# Patient Record
Sex: FEMALE | Race: WHITE | NOT HISPANIC OR LATINO | URBAN - METROPOLITAN AREA
[De-identification: names, ages, dates, MRNs, and addresses within clinical notes are randomized per-mention and may not be internally consistent; named-entity substitution may affect disease eponyms.]

---

## 2018-09-19 ENCOUNTER — HOSPITAL ENCOUNTER (OUTPATIENT)
Dept: LAB | Facility: MEDICAL CENTER | Age: 33
End: 2018-09-19
Attending: PSYCHIATRY & NEUROLOGY
Payer: MEDICARE

## 2018-09-19 ENCOUNTER — OFFICE VISIT (OUTPATIENT)
Dept: NEUROLOGY | Facility: MEDICAL CENTER | Age: 33
End: 2018-09-19
Payer: MEDICARE

## 2018-09-19 VITALS
BODY MASS INDEX: 18.4 KG/M2 | TEMPERATURE: 98.1 F | SYSTOLIC BLOOD PRESSURE: 110 MMHG | HEIGHT: 62 IN | HEART RATE: 70 BPM | RESPIRATION RATE: 20 BRPM | WEIGHT: 100 LBS | OXYGEN SATURATION: 91 % | DIASTOLIC BLOOD PRESSURE: 62 MMHG

## 2018-09-19 DIAGNOSIS — G40.409 SIMPLE PARTIAL SEIZURES EVOLVING TO GENERALIZED TONIC-CLONIC SEIZURES (HCC): Primary | ICD-10-CM

## 2018-09-19 DIAGNOSIS — G80.8 CEREBRAL PALSY, QUADRIPLEGIC (HCC): ICD-10-CM

## 2018-09-19 PROBLEM — R56.9 SEIZURE (HCC): Status: ACTIVE | Noted: 2018-09-19

## 2018-09-19 LAB
25(OH)D3 SERPL-MCNC: 35 NG/ML (ref 30–100)
AMMONIA PLAS-SCNC: 35 UMOL/L (ref 11–45)
VALPROATE SERPL-MCNC: 95.5 UG/ML (ref 50–100)

## 2018-09-19 PROCEDURE — 82306 VITAMIN D 25 HYDROXY: CPT | Mod: GA

## 2018-09-19 PROCEDURE — 80164 ASSAY DIPROPYLACETIC ACD TOT: CPT

## 2018-09-19 PROCEDURE — 36415 COLL VENOUS BLD VENIPUNCTURE: CPT

## 2018-09-19 PROCEDURE — 99205 OFFICE O/P NEW HI 60 MIN: CPT | Performed by: PSYCHIATRY & NEUROLOGY

## 2018-09-19 PROCEDURE — 82140 ASSAY OF AMMONIA: CPT

## 2018-09-19 RX ORDER — PNV NO.95/FERROUS FUM/FOLIC AC 28MG-0.8MG
TABLET ORAL
COMMUNITY
End: 2022-03-21

## 2018-09-19 RX ORDER — LEVETIRACETAM 500 MG/1
TABLET ORAL
Refills: 1 | COMMUNITY
Start: 2018-09-07 | End: 2019-03-20

## 2018-09-19 RX ORDER — DIVALPROEX SODIUM 125 MG/1
CAPSULE, COATED PELLETS ORAL
Qty: 240 CAP | Refills: 11 | Status: SHIPPED | OUTPATIENT
Start: 2018-09-19 | End: 2020-08-07 | Stop reason: SDUPTHER

## 2018-09-19 RX ORDER — LEVETIRACETAM 750 MG/1
1 TABLET, FILM COATED, EXTENDED RELEASE ORAL DAILY
Qty: 30 TAB | Refills: 1 | Status: SHIPPED | OUTPATIENT
Start: 2018-09-19 | End: 2018-11-29 | Stop reason: SDUPTHER

## 2018-09-19 RX ORDER — DIVALPROEX SODIUM 125 MG/1
CAPSULE, COATED PELLETS ORAL
Refills: 5 | COMMUNITY
Start: 2018-08-22 | End: 2018-09-19 | Stop reason: SDUPTHER

## 2018-09-19 RX ORDER — BACLOFEN 10 MG/1
TABLET ORAL
Refills: 5 | COMMUNITY
Start: 2018-08-21 | End: 2018-09-19 | Stop reason: SDUPTHER

## 2018-09-19 RX ORDER — POLYETHYLENE GLYCOL 3350 17 G/17G
POWDER, FOR SOLUTION ORAL
Refills: 11 | COMMUNITY
Start: 2018-07-19

## 2018-09-19 RX ORDER — SERTRALINE HYDROCHLORIDE 100 MG/1
200 TABLET, FILM COATED ORAL DAILY
Refills: 5 | COMMUNITY
Start: 2018-08-21

## 2018-09-19 RX ORDER — BACLOFEN 10 MG/1
10 TABLET ORAL 3 TIMES DAILY
Qty: 270 TAB | Refills: 3 | Status: SHIPPED | OUTPATIENT
Start: 2018-09-19 | End: 2022-03-21 | Stop reason: CLARIF

## 2018-09-19 NOTE — PROGRESS NOTES
Subjective:      Marie Sanchez is a 32 y.o. female who presents with her mother Shahnaz, who provides all of the history, the patient herself noncommunicative, with a history of  CP and symptomatic seizures.    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    The events themselves are fairly stereotyped, her mother describes the lower extremities engaging in myoclonic activity, initially more of the left leg than right, now bilateral. She seems to zone out and is clearly less responsive, a head begins to turn to the left, she will also have gaze vergence to the left, now completely unresponsive, there is bilateral tonic posturing. The seizures can last upwards of a couple of minutes, the longest was 5 minutes. Recovery is full though can take upwards of 45 minutes depending on duration of the seizure itself.    The seizures tend to occur in the afternoons or early evenings, they have never happened while the patient was asleep. They tend to cluster together, usually no more than 2 at a time, though she can have them in isolation. Over time the frequency, severity and duration as well as the number of headaches in a day have all been increasing. The longer she has been without a seizure now bases about a week or 2, in the past the interval between events was longer.    Under the care of several local neurologists, this stopped about 10 years ago. Originally placed on Dilantin and Tegretol, these provided limited benefit only. Placed on Depakote, she now uses 125 mg sprinkle tablet, she is been averaging anywhere from #8-#9 tablets in a day, her mother believes the dose was varied depending on drug levels and efficacy. She has never been on a higher number of tablets in a day. There does not appear to be issues with side effects. Her mother does not know her drug levels.    Patient does take  her medications when her mother gives in to her. About 6 weeks ago, she actually had her first day of more than 3 seizures, she was placed on Keppra 500 mg daily by her PCP, and in the last 6 weeks she is actually had only 2 seizures, these occurred in isolation and were of shorter duration. Her mother has not noted any changes in personality, drowsiness throughout the day or tremulousness since she was started on the drug.    Her CP has limited her ability to communicate though she seems to be able to acknowledge certain needs including going to the bathroom. She needs to be fed, she does recognize her mother and people she knows, can do some things for herself. She can grab with both upper extremities, though the left is more limited than the right. She can stand but only with maximal assistance. She has been on baclofen 10 mg, 3 times a day for spasticity, having undergone bilateral hamstring tendon release a while ago. She has been on Zoloft 100 mg daily for some time because of panic episodes when she was younger. Constipation is a real problem.    Medical history is otherwise unremarkable for significant issues. There is no history of liver or kidney disease, malignancy, thyroid disease, heart disease, PVD, or other neurologic disease. There is no other surgical history of note.    Interestingly, both her mother and brother suffer from seizures, her mother also from depression. Her father  from cancer. There is no other family medical history of note. She is a does not smoke or drink. She has a daily care facility that she attends, she seems to be quite happy when she is there.    She is on Depakote 125 mg tablets, 3-3-2 taken over the day, Zoloft 100 mg daily, baclofen 10 mg, 3 times a day, Meseret lax, the rest as per the electronic health record, noncontributory from my standpoint.    Review of Systems   Unable to perform ROS: Mental acuity        Objective:     /62 (BP Location: Left arm, Patient  "Position: Sitting)   Pulse 70   Temp 36.7 °C (98.1 °F) (Temporal)   Resp 20   Ht 1.575 m (5' 2\")   Wt 45.4 kg (100 lb)   SpO2 91%   BMI 18.29 kg/m²      Physical Exam    She appears in a wheelchair, and is in no acute distress. Her vital signs are stable. There is no malar rash. The neck is supple. Cardiac evaluation reveals a regular rhythm. She has bilateral AFOs.    She does recognize her mother when her name is called, she does look at the examiner when he does as well. She acknowledges the examiner and both spatial hemifield. There is no purposeful verbalization, mostly redundant grunts and sounds, though she can follow some commands with both hands such as squeezing the examiner's fingers.    PERRLA, eyes are moved with disconjugate see, to threat she does blink and avoid appropriately. She feels temperature bilaterally, she withdraws to noxious stimuli on both sides of the face. She mumbles unintelligibly otherwise.    Musculoskeletal exam reveals a spastic quadriparesis, both legs more notably involved in the arms, the left arm more involved than the right. She does elevate the upper extremities and can use them with some purposefulness. Reflexes cannot be accurately assessed.    She can grab the examiner's finger with both upper extremities though it is more just ataxic with the left.    She does localize and withdraw to noxious stimuli when applied in all 4 extremities.     Assessment/Plan:     1. Simple partial seizures evolving to generalized tonic-clonic seizures (HCC)  I would like to further improve seizure control, her PCP was appropriate in adding Keppra, though we could consider increasing Depakote moving forwards. Keppra  mg will be tried, taken in the morning to provide better benefit throughout the day when she is more likely to have seizures. Sustained-release formulation is always better tolerated. I talked about side effects with her mother. Her mother will keep track of these " events as a measure of efficacy. I will check a Depakote level, ammonia and a vitamin D level. I talked with her mother about the higher risk of osteoporosis in patients or sedentary, though first generation antiepileptics increase this risk notably. I also told Shahnaz to give her daughter a vitamin D 1000 mg supplement every day.    We spent some time talking about her seizures, treatment options, prognosis, etc. I also gave her mother instructions for the care facility to follow in case Marie does have a seizure while under their care. Her mother was told that Marie does not need to go to an emergency room if isolated seizures recur, rather only if she has multiple seizures in rapid succession over a 30 minute interval or less. Her mother will call the office in regards to drug tolerability issues and seizure recurrences. Though I would like to get an EEG study as a baseline test, it will be difficult to do so, I will talk with her mother about this moving forwards.    - Divalproex Sodium (DEPAKOTE) 125 MG Capsule Delayed Release Sprinkle; 3 tab bid and 2 tab qhs  Dispense: 240 Cap; Refill: 11  - Levetiracetam 750 MG TABLET SR 24 HR; Take 1 Tab by mouth every day.  Dispense: 30 Tab; Refill: 1  - VALPROIC ACID; Standing  - AMMONIA; Standing  - VITAMIN D,25 HYDROXY; Standing    2. Cerebral palsy, quadriplegic (HCC)  A static process, obviously, I spent some time with her mother talking about her lifelong survival rate, issues in regards to medical complications and disability as she ages, increasing risks of things like infection, skin breakdown, general debility with reduced by mouth intake, etc., as it is these acute medical conditions that ultimately result in death. Though her mother asked for an estimate as to how long I think her daughter could survive, I told her that is not something that can be predicted at this time. Baclofen will be continued.    - baclofen (LIORESAL) 10 MG Tab; Take 1 Tab by mouth 3 times  a day.  Dispense: 270 Tab; Refill: 3    Time: 60 minutes was spent face-to-face with the patient and her mother, for example, review, discussion, and education, of this over 50% of the time was spent with the patient's mother counseling and coordinating care.

## 2018-11-29 DIAGNOSIS — G40.409 SIMPLE PARTIAL SEIZURES EVOLVING TO GENERALIZED TONIC-CLONIC SEIZURES (HCC): ICD-10-CM

## 2018-11-29 RX ORDER — LEVETIRACETAM 750 MG/1
1 TABLET, FILM COATED, EXTENDED RELEASE ORAL DAILY
Qty: 90 TAB | Refills: 1 | Status: SHIPPED | OUTPATIENT
Start: 2018-11-29 | End: 2020-01-22

## 2019-03-20 ENCOUNTER — OFFICE VISIT (OUTPATIENT)
Dept: NEUROLOGY | Facility: MEDICAL CENTER | Age: 34
End: 2019-03-20
Payer: MEDICARE

## 2019-03-20 VITALS
BODY MASS INDEX: 18.29 KG/M2 | HEART RATE: 71 BPM | RESPIRATION RATE: 17 BRPM | OXYGEN SATURATION: 93 % | WEIGHT: 100 LBS | DIASTOLIC BLOOD PRESSURE: 76 MMHG | TEMPERATURE: 96.8 F | SYSTOLIC BLOOD PRESSURE: 130 MMHG

## 2019-03-20 DIAGNOSIS — G40.409 SIMPLE PARTIAL SEIZURES EVOLVING TO GENERALIZED TONIC-CLONIC SEIZURES (HCC): Primary | ICD-10-CM

## 2019-03-20 PROCEDURE — 99214 OFFICE O/P EST MOD 30 MIN: CPT | Performed by: PSYCHIATRY & NEUROLOGY

## 2019-03-20 RX ORDER — LACOSAMIDE 50 MG/1
TABLET ORAL
Qty: 120 TAB | Refills: 1 | Status: SHIPPED | OUTPATIENT
Start: 2019-03-20 | End: 2019-05-08 | Stop reason: SDUPTHER

## 2019-03-20 NOTE — PROGRESS NOTES
Subjective:      Marie Sanchez is a 33 y.o. female who presents with her mother Shahnaz, who provides the history, for follow-up, with a history of symptomatic focal seizures with secondary generalization related to CP with severe cognitive impairment.    HPI    When last seen, her seizures had started to come under better control, already on a stable dose of Depakene sprinkles, Keppra 500 mg was added, I had increased to 750 mg.  Her mother states that she has had no secondary generalized seizures, even the simple seizures with altered alertness have improved markedly.    Unfortunately, the longer she has been on Keppra, there has been a notable change in her personality.  Though the behaviors are in and of themselves chronic, it was part of her personality, the intensity and duration has worsened.  At times she will even start to cry spontaneously, seemingly inconsolable for upwards of 30-40 minutes.  She is still compliant with her medications otherwise.  She is eating well.  Between these outbursts, she does well.  She is still capable of communicating her needs to her mother as she has been, etc.    Other than the Keppra, Zoloft was increased to help with some of these emotional outbursts, now at 200 mg daily, with questionable benefit.    Medical, surgical and family histories are reviewed, there are no new drug allergies.  They are now looking to slowly transition her into a long-term care facility, beginning in the next month, they will have her stay for several days at a time, slowly increasing the duration where she is away from home.  She is on Depakene 125 mg sprinkles, 3-3-2 taken of the day, Zoloft 200 mg daily, baclofen 10 mg, 3 times daily, Keppra 750 mg daily, Zantac, iron supplement and MiraLAX.    Review of Systems   Unable to perform ROS: Mental acuity        Objective:     /76 (BP Location: Right arm, Patient Position: Sitting)   Pulse 71   Temp 36 °C (96.8 °F)   Resp 17   Wt 45.4 kg (100  lb)   SpO2 93%   BMI 18.29 kg/m²      Physical Exam    As always, she appears in a wheelchair, she recognizes the examiner when her name is called, she does have the ability to interact in limited fashion.  She is nonverbal, nonetheless.  Her vital signs are stable.  There is no malar rash.  The neck is supple.  Cardiac evaluation reveals a regular rhythm.    Behaviorally, she is not overtly agitated.  She grabs the examiner's hand when asked, she uses the right more than left.  Extraocular muscle movements reveal the disconjugate movements, all unchanged.  The dystonic spontaneous movements with the upper extremities is unchanged.     Assessment/Plan:     1. Simple partial seizures evolving to generalized tonic-clonic seizures (HCC)  For now, I am concerned that these changes in her personality have more to do with a cumulative effect of Keppra itself, rather than simple exacerbation of an underlying behavioral condition that is part of her CP.  The former is a lot easier to deal with and correct.  The rationale for this was reviewed with her mother.    She understands that there is some risk of seizure recurrence during the transition, but I think this can be minimized, Vimpat will be added and increased to a therapeutic dose before Keppra is discontinued.  Thus, Vimpat 50 mg, twice daily for 1 week, then 100 mg, twice daily for the second week.  At the beginning of the third week, Keppra will be discontinued, Vimpat 100 mg, twice daily continued.  Side effects of Vimpat were reviewed, I suspect she will do well with this.    If the behavioral problems do not begin to improve, though her mother was told this may take weeks, as long as the seizures are controlled, we can maintain Vimpat and psychotropic medication adjustments can then be made.  All of this is important since her parents are arranging to have her moved to a long-term care facility.  From my standpoint we can follow-up in 6 months, phone contact in  the interim.    - lacosamide (VIMPAT) 50 MG Tab tablet; 1 tab bid for 1 week, then 2 tab bid  Dispense: 120 Tab; Refill: 1    Time: 25 minutes spent face-to-face for exam, review, discussion, and education, of this over 50% of the time spent counseling and coordinating care surrounding all of the above issues.

## 2019-05-08 DIAGNOSIS — G40.409 SIMPLE PARTIAL SEIZURES EVOLVING TO GENERALIZED TONIC-CLONIC SEIZURES (HCC): ICD-10-CM

## 2019-05-08 RX ORDER — LACOSAMIDE 100 MG/1
100 TABLET ORAL 2 TIMES DAILY
Qty: 60 TAB | Refills: 5 | Status: SHIPPED | OUTPATIENT
Start: 2019-05-08 | End: 2019-11-08

## 2019-11-27 ENCOUNTER — APPOINTMENT (OUTPATIENT)
Dept: NEUROLOGY | Facility: MEDICAL CENTER | Age: 34
End: 2019-11-27
Payer: MEDICARE

## 2020-01-22 ENCOUNTER — OFFICE VISIT (OUTPATIENT)
Dept: NEUROLOGY | Facility: MEDICAL CENTER | Age: 35
End: 2020-01-22
Payer: MEDICARE

## 2020-01-22 VITALS
SYSTOLIC BLOOD PRESSURE: 96 MMHG | OXYGEN SATURATION: 86 % | HEART RATE: 66 BPM | DIASTOLIC BLOOD PRESSURE: 50 MMHG | BODY MASS INDEX: 16.1 KG/M2 | RESPIRATION RATE: 14 BRPM | WEIGHT: 88 LBS

## 2020-01-22 DIAGNOSIS — G40.409 SIMPLE PARTIAL SEIZURES EVOLVING TO GENERALIZED TONIC-CLONIC SEIZURES (HCC): Primary | ICD-10-CM

## 2020-01-22 PROCEDURE — 99213 OFFICE O/P EST LOW 20 MIN: CPT | Performed by: PSYCHIATRY & NEUROLOGY

## 2020-01-22 RX ORDER — LACOSAMIDE 150 MG/1
1 TABLET ORAL 2 TIMES DAILY
Qty: 60 TAB | Refills: 5 | Status: SHIPPED | OUTPATIENT
Start: 2020-01-22 | End: 2020-07-24 | Stop reason: SDUPTHER

## 2020-01-22 RX ORDER — LACOSAMIDE 100 MG/1
100 TABLET, FILM COATED ORAL 2 TIMES DAILY
COMMUNITY
Start: 2019-12-31 | End: 2020-01-22

## 2020-01-22 RX ORDER — LORAZEPAM 1 MG/1
1 TABLET ORAL
COMMUNITY
Start: 2019-11-27 | End: 2020-08-07

## 2020-01-22 RX ORDER — FLUTICASONE PROPIONATE 50 MCG
SPRAY, SUSPENSION (ML) NASAL
COMMUNITY
Start: 2020-01-13

## 2020-01-23 NOTE — PROGRESS NOTES
Subjective:      Marie Sanchez is a 34 y.o. female who presents with her mother, and now 2 healthcare providers, for follow-up with a history of symptomatic focal onset seizures with altered cognition and unilateral motor movements.    HPI    Since last seen, we had gotten Marie off Keppra, though the drug was providing good seizure control, there were significant personality changes.  Substituted with Vimpat 100 mg, twice daily, things have quieted down.  She remains on Depakote 125 mg sprinkles, 3-3-2 tablets given over the day.    What they have noted is on this new regimen, if her mother is just a little late with her Depakote, she may have a breakthrough focal seizure with staring and altered sensorium.  This was never an issue on Keppra.  Over the last 2 months, she was then transferred from home into this new assisted living facility, there was an immense amount of stress with this, and she has had maybe 5 seizures.  This time they were more sustained, 2 of them lasting 3 minutes or so, associated with left-sided focal motor involvement with the arm and leg.  Because of the duration, she was brought to the emergency room as part of the home's medical practice policy.  Blood levels were drawn, evidently the Depakote level was normal/therapeutic.  Her medication doses were not changed.    At her new care facility, things are doing well.  She still can indicate certain needs as she has been able to do in the past.  They are learning her indicators that she has to go to the bathroom.  She does take medications on a scheduled basis.    Medical, surgical and family histories are reviewed, there are no new drug allergies.  She is on Depakote sprinkles and Vimpat as above.  She is also on Zoloft 200 mg daily, Zantac, Ativan 1 mg as needed, baclofen has been increased because of the spasticity, now taking 4 tablets in a day at 1-1-2 tablet doses, and the rest as per the electronic health record, noncontributory from my  standpoint.    Review of Systems   Unable to perform ROS: Mental acuity        Objective:     BP (!) 96/50 (BP Location: Right arm, Patient Position: Sitting, BP Cuff Size: Adult)   Pulse 66   Resp 14   Wt 39.9 kg (88 lb) Comment: per caregrivers  SpO2 (!) 86%   BMI 16.10 kg/m²      Physical Exam    She appears as usual in her wheelchair, and is in no acute distress.  Her vital signs are stable.  She interacts intermittently with her mother as well as her caregivers.  There is no malar rash.    She is awake and alert, looks at the name caller.  Dystonic and akathetic movements are seen throughout the interview and examination.     Assessment/Plan:     1. Simple partial seizures evolving to generalized tonic-clonic seizures (HCC)  Things have had some turnaround, Vimpat should be increased slightly to provide better seizure control.  Thus, the dose will be increased to 150 mg, twice daily.  Side effects were reviewed.  Depakote sprinkles will be continued unchanged.  We can follow-up in 6 months otherwise.  Phone contact if the patient has side effects on the higher dose of Vimpat.    - Lacosamide (VIMPAT) 150 MG Tab; Take 1 Tab by mouth 2 Times a Day for 182 days.  Dispense: 60 Tab; Refill: 5    Time: 20-minute spent face-to-face for exam, review, discussion, and education, of this over 50% of the time spent counseling and coordinating care.

## 2020-02-21 ENCOUNTER — TELEPHONE (OUTPATIENT)
Dept: NEUROLOGY | Facility: MEDICAL CENTER | Age: 35
End: 2020-02-21

## 2020-02-21 NOTE — TELEPHONE ENCOUNTER
Patient came in today for samples of Vimpat 150 mg while I work on her PA for this medication. ASAP

## 2020-03-05 ENCOUNTER — TELEPHONE (OUTPATIENT)
Dept: NEUROLOGY | Facility: MEDICAL CENTER | Age: 35
End: 2020-03-05

## 2020-03-05 NOTE — TELEPHONE ENCOUNTER
Tried calling [facility] pharmacy needing her RX#, BIN, PCN, ID#: left voice message for them to call me back with that information for her Vimpat.

## 2020-03-06 ENCOUNTER — TELEPHONE (OUTPATIENT)
Dept: NEUROLOGY | Facility: MEDICAL CENTER | Age: 35
End: 2020-03-06

## 2020-03-06 NOTE — TELEPHONE ENCOUNTER
Tried calling patients pharmacy again on 3/6/2020 for patients Vimpat 150mg to get information on PA for this medication. It just goes straight to  when you ask to speak with someone.

## 2020-03-25 ENCOUNTER — TELEPHONE (OUTPATIENT)
Dept: NEUROLOGY | Facility: MEDICAL CENTER | Age: 35
End: 2020-03-25

## 2020-03-25 NOTE — TELEPHONE ENCOUNTER
PA submitted through CoverMyMeds. Note from insurance company states that PA is not required at this time as it is part of plans covered drugs.

## 2020-07-24 DIAGNOSIS — G40.409 SIMPLE PARTIAL SEIZURES EVOLVING TO GENERALIZED TONIC-CLONIC SEIZURES (HCC): ICD-10-CM

## 2020-07-24 RX ORDER — LACOSAMIDE 150 MG/1
1 TABLET, FILM COATED ORAL 2 TIMES DAILY
Qty: 60 TAB | Refills: 5 | Status: SHIPPED | OUTPATIENT
Start: 2020-07-24 | End: 2021-01-04 | Stop reason: SDUPTHER

## 2020-08-07 ENCOUNTER — OFFICE VISIT (OUTPATIENT)
Dept: NEUROLOGY | Facility: MEDICAL CENTER | Age: 35
End: 2020-08-07
Payer: MEDICARE

## 2020-08-07 VITALS — HEART RATE: 70 BPM | SYSTOLIC BLOOD PRESSURE: 100 MMHG | DIASTOLIC BLOOD PRESSURE: 70 MMHG

## 2020-08-07 DIAGNOSIS — G40.409 SIMPLE PARTIAL SEIZURES EVOLVING TO GENERALIZED TONIC-CLONIC SEIZURES (HCC): Primary | ICD-10-CM

## 2020-08-07 PROCEDURE — 99213 OFFICE O/P EST LOW 20 MIN: CPT | Performed by: PSYCHIATRY & NEUROLOGY

## 2020-08-07 RX ORDER — DIVALPROEX SODIUM 125 MG/1
CAPSULE, COATED PELLETS ORAL
Qty: 240 CAP | Refills: 11 | Status: SHIPPED | OUTPATIENT
Start: 2020-08-07 | End: 2021-09-28

## 2020-08-07 ASSESSMENT — PATIENT HEALTH QUESTIONNAIRE - PHQ9: CLINICAL INTERPRETATION OF PHQ2 SCORE: 0

## 2020-08-07 NOTE — PROGRESS NOTES
Subjective:      Marie Sanchez is a 34 y.o. female who presents with her new caretaker, Dariel (BARBERN), for 6-month follow-up, with a history of symptomatic focal onset seizures with altered sensorium, and rare secondary generalization.    HPI    Since last seen, Marie actually has done better.  We had increased Vimpat to 150 mg, twice daily, continuing the Depakene sprinkle 125 mg tablets, 3-3-2 over the course of the day.  She is no longer susceptible to small seizures if there is a delay in the Depakene dosing.  She has an occasional, seemingly random small event and this is very infrequent.    When she went to see her mother, who is noncompliant with her medications, she returned and was having a cluster of seizures until she had been on her regular regimen for several days.  Things got back to baseline easily.  She was then hospitalized because of aspiration pneumonia and she had a rare secondarily generalized seizure at that time.    Back at the care facility, she has been doing well.  She indicates her needs with grimacing and vocalizations as well as a fist that is made with the right hand.  The continuous dyskinesias continue unchanged.  She is eating well.  There have been no falls.  Her needs for toileting, bathing, etc. have not changed.  Overall her personality is still quite happy and easygoing.  She has no ability to verbalize meaningfully.    Medical, surgical and family histories are reviewed, there are no new drug allergies.  She is on her Vimpat and Depakene as above, also baclofen 40 mg, 3 times daily, Zantac, Zoloft, MiraLAX, vitamin D with calcium, iron and Flonase.    Review of Systems   Unable to perform ROS: Mental acuity        Objective:     /70 (BP Location: Left arm, Patient Position: Sitting, BP Cuff Size: Adult)   Pulse 70      Physical Exam    She appears in no acute distress.  She is in wheelchair, frequently vocalizing and grimacing, but is clearly aware of her surroundings.  Her  vital signs are stable.  There is no malar rash.  The neck is supple.  Cardiac evaluation is unremarkable.    She looks at the examiner when her name was called, she does seem to indicate with happy vocalizations that she wants to do certain things.  She may follow a single command.  She does indicate her willingness to fist bump when greeting me.  She is in a continuous state of movement with axial dyskinesias, in the upper extremities to a lesser degree but still symmetric.  Visual fields are grossly intact, she seems to acknowledge the examiner when coming to her in both visual fields.  Both lower extremities are braced.  Tone is increased bilaterally.     Assessment/Plan:     1. Simple partial seizures evolving to generalized tonic-clonic seizures (HCC)  Clinically things are stable, there is no reason to rock the boat.  The instability occurring only if she is with her mother and/or if she is hospitalized due to some other superimposed medical problem.  Staff at the care facility are clearly aware of the difficulties.  They know circumstances under which she needs to go to an ER versus calling my office.  Unfortunately, as per their protocol, the patient ends up going to the emergency room more often than she probably needs to.  In any case, there is no reason to change her medication regimen, we can follow-up in 1 year.    - divalproex (DEPAKOTE SPRINKLE) 125 MG Capsule Delayed Release Sprinkle; 3 tab bid and 2 tab qhs  Dispense: 240 Cap; Refill: 11    Time: 20-minute spent face-to-face for exam, review, discussion, and education, of this over 50% of the time spent counseling and coordinating care.

## 2021-01-04 DIAGNOSIS — G40.409 SIMPLE PARTIAL SEIZURES EVOLVING TO GENERALIZED TONIC-CLONIC SEIZURES (HCC): ICD-10-CM

## 2021-01-04 RX ORDER — LACOSAMIDE 150 MG/1
1 TABLET, FILM COATED ORAL 2 TIMES DAILY
Qty: 60 TAB | Refills: 5 | Status: SHIPPED | OUTPATIENT
Start: 2021-01-04 | End: 2021-07-12

## 2021-01-04 NOTE — TELEPHONE ENCOUNTER
Received request via: Pharmacy    Was the patient seen in the last year in this department? Yes    Does the patient have an active prescription (recently filled or refills available) for medication(s) requested? No     Patient last seen on 08/07/2020 , medication last filled on 07/24/2020      Patient has not scheduled a follow up appointment .

## 2021-07-09 DIAGNOSIS — G40.409 SIMPLE PARTIAL SEIZURES EVOLVING TO GENERALIZED TONIC-CLONIC SEIZURES (HCC): ICD-10-CM

## 2021-07-12 RX ORDER — LACOSAMIDE 150 MG/1
1 TABLET, FILM COATED ORAL 2 TIMES DAILY
Qty: 60 TABLET | Refills: 5 | Status: SHIPPED | OUTPATIENT
Start: 2021-07-12 | End: 2022-01-03

## 2021-07-12 NOTE — TELEPHONE ENCOUNTER
Received request via: Pharmacy    Was the patient seen in the last year in this department? Yes    Does the patient have an active prescription (recently filled or refills available) for medication(s) requested? No     Patient last seen on 08/07/2020.

## 2021-09-27 ENCOUNTER — APPOINTMENT (OUTPATIENT)
Dept: NEUROLOGY | Facility: MEDICAL CENTER | Age: 36
End: 2021-09-27
Attending: PSYCHIATRY & NEUROLOGY
Payer: MEDICARE

## 2021-12-31 DIAGNOSIS — G40.409 SIMPLE PARTIAL SEIZURES EVOLVING TO GENERALIZED TONIC-CLONIC SEIZURES (HCC): ICD-10-CM

## 2022-01-03 RX ORDER — LACOSAMIDE 150 MG/1
TABLET, FILM COATED ORAL
Qty: 60 TABLET | Refills: 0 | Status: SHIPPED | OUTPATIENT
Start: 2022-01-03 | End: 2022-03-14

## 2022-01-03 NOTE — TELEPHONE ENCOUNTER
Received request via: Pharmacy    Was the patient seen in the last year in this department? No     Does the patient have an active prescription (recently filled or refills available) for medication(s) requested? Yes      Pt has not been seen in last year, but has appt scheduled 1/17/22

## 2022-03-21 ENCOUNTER — OFFICE VISIT (OUTPATIENT)
Dept: NEUROLOGY | Facility: MEDICAL CENTER | Age: 37
End: 2022-03-21
Attending: PSYCHIATRY & NEUROLOGY
Payer: MEDICARE

## 2022-03-21 ENCOUNTER — TELEPHONE (OUTPATIENT)
Dept: NEUROLOGY | Facility: MEDICAL CENTER | Age: 37
End: 2022-03-21

## 2022-03-21 VITALS — TEMPERATURE: 98.7 F | DIASTOLIC BLOOD PRESSURE: 52 MMHG | SYSTOLIC BLOOD PRESSURE: 106 MMHG | HEART RATE: 73 BPM

## 2022-03-21 DIAGNOSIS — G80.8 CEREBRAL PALSY, QUADRIPLEGIC (HCC): ICD-10-CM

## 2022-03-21 DIAGNOSIS — G40.409 SIMPLE PARTIAL SEIZURES EVOLVING TO GENERALIZED TONIC-CLONIC SEIZURES (HCC): ICD-10-CM

## 2022-03-21 PROCEDURE — 99212 OFFICE O/P EST SF 10 MIN: CPT | Performed by: PSYCHIATRY & NEUROLOGY

## 2022-03-21 PROCEDURE — 99214 OFFICE O/P EST MOD 30 MIN: CPT | Performed by: PSYCHIATRY & NEUROLOGY

## 2022-03-21 RX ORDER — BACLOFEN 10 MG/1
10 TABLET ORAL 3 TIMES DAILY
COMMUNITY

## 2022-03-21 RX ORDER — DIVALPROEX SODIUM 125 MG/1
CAPSULE, COATED PELLETS ORAL
Qty: 720 CAPSULE | Refills: 3 | Status: SHIPPED | OUTPATIENT
Start: 2022-03-21

## 2022-03-21 RX ORDER — LORAZEPAM 1 MG/1
TABLET ORAL
COMMUNITY
Start: 2022-03-14

## 2022-03-21 RX ORDER — LACOSAMIDE 150 MG/1
1 TABLET, FILM COATED ORAL 2 TIMES DAILY
Qty: 180 TABLET | Refills: 3 | Status: SHIPPED | OUTPATIENT
Start: 2022-03-21 | End: 2022-09-16

## 2022-03-21 RX ORDER — CEPHALEXIN 500 MG/1
CAPSULE ORAL
COMMUNITY
Start: 2021-12-23

## 2022-03-21 RX ORDER — BACLOFEN 10 MG/1
5 TABLET ORAL 3 TIMES DAILY
COMMUNITY

## 2022-03-21 ASSESSMENT — PATIENT HEALTH QUESTIONNAIRE - PHQ9: CLINICAL INTERPRETATION OF PHQ2 SCORE: 0

## 2022-03-21 NOTE — PROGRESS NOTES
"Subjective     Marie Sanchez is a 36 y.o. 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  As always, the history is gotten from the patient's mother, the patient herself incapable of meaningful communication.    HPI    Last seen in August, 2020, we have maintained a stable regimen of Vimpat and Depakene sprinkles.  She is compliant when the pills are given to her.  Her last seizure occurred back in December, 2021.  At the time she was with her mother.  Her mother describes 2 generalized seizures which occurred back to back.  EMS was not contacted.  The patient recovered.    At the time her mother states she was compliant with medicines that she was giving her, she had not been given new medications.  She was not suffering from any flulike illnesses with vomiting or diarrhea.  There has been a chronic issue with weight loss, more likely secondary to reduced p.o. intake, but there was no sudden change in this condition.  They brought with him copies of blood work drawn on December 21, 2021, Depakote level 53.6, but sodium 150, BUN/creatinine 24/0.7, and osmolality 302.  CBC was normal.  Lacosamide level was not checked.    For the spasticity, she had been seemingly more uncomfortable, she was crying out more seemingly \"stiffer\".  Baclofen was increased from 10 mg to 15 mg, 3 times daily, and her overall demeanor quieted down.  She has been tolerating the higher dose without issue.    In general, her behavior is the same.  She has some ability to let her wishes be known, limited ability to help participate in her daily routines.    Medical, surgical and family histories are reviewed, there are no new drug allergies.  With Covid-19, the staffing at her present group home has diminished, she has been spending more time with her mother as a result.  She is on Depakote sprinkles, 125 mg, 3 tablets twice a " day and 2 tablets every evening, Vimpat 150 mg, twice daily, baclofen 15 mg, 3 times daily, Keflex, Ativan 1 mg daily, vitamin D with folic acid, MiraLAX, Zantac, Zoloft, and vitamin D.  She has stopped taking her iron supplements.    Review of Systems   Unable to perform ROS: Mental acuity     Objective     /52 (BP Location: Right arm, Patient Position: Sitting, BP Cuff Size: Adult)   Pulse 73   Temp 37.1 °C (98.7 °F) (Temporal)      Physical Exam    She appears in a wheelchair, in no acute distress.  Vital signs are stable.  There is no malar rash.  Her neck is supple.  Cardiac evaluation reveals a regular rhythm.     Neurological Exam    She does look at the examiner, and her mother, when her name is called.  She, again, offers her hand to be shaken when I enter the room.  They are continuous dyskinetic movements in all 4 extremities though she still has bilateral AFOs.  Further assessment was not done.    Assessment & Plan     1. Simple partial seizures evolving to generalized tonic-clonic seizures (HCC)  We will continue her present regimen unchanged.  There is no reason to alter anything at this time.  Occasional breakthrough events may happen, I am not getting a sense of a pattern with more frequent smaller seizures, though there was some concern given that she had 2 generalized events in rapid succession.  Fortunately blood work revealed no significant changes though a high sodium, alterations in BUN/creatinine and osmolality were found, consistent with significant dehydration.  This is not a condition usually associated with breakthrough seizures.  Depakote level was therapeutic, though in the low range.  I would rather hold on and follow through to see what happens moving forwards.  There is no reason for EEG or MRI imaging.    Socially, she will be transferred to a different group home closer to where her mother lives in [place].  They will notify the office when this happens so that a new  pharmacy can be used for her medications.  For now she has been refilled for a year.  We can follow-up in 1 year, assuming things are stable.    - divalproex (DEPAKOTE SPRINKLE) 125 MG Capsule Delayed Release Sprinkle; TAKE THREE(3) CAPSULES ORALLY TWO TIMES A DAY AND TAKE TWO(2) CAPSULES ORALLY NIGHTLY AT BEDTIME  Dispense: 720 Capsule; Refill: 3  - Lacosamide (VIMPAT) 150 MG Tab; Take 1 Tablet by mouth 2 times a day for 360 days.  Dispense: 180 Tablet; Refill: 3    2. Cerebral palsy, quadriplegic (HCC)  The higher dose of baclofen is appropriate, she is getting this through her PCPs office.    Time: 30 minutes in total spent in patient care including precharting, record review, discussions with healthcare staff and documentation.  This includes face-to-face time with the patient and her mother for exam, review, discussion, as well as counseling and coordinating care.

## 2022-03-21 NOTE — TELEPHONE ENCOUNTER
Vimpat 150mg Tablet    RTS - NEXT AVAILABLE FILL DATE [id number] LAST FILL DT [id number] FILLED AT [facility],PHONE #[phone] NON-SPECIALTY DRUG [id number] - 03/21/2022 12:40pm

## 2022-09-12 DIAGNOSIS — G40.409 SIMPLE PARTIAL SEIZURES EVOLVING TO GENERALIZED TONIC-CLONIC SEIZURES (HCC): ICD-10-CM

## 2022-09-16 RX ORDER — LACOSAMIDE 150 MG/1
TABLET ORAL
Qty: 360 TABLET | Refills: 4 | Status: SHIPPED | OUTPATIENT
Start: 2022-09-16 | End: 2022-10-12